# Patient Record
Sex: FEMALE | Race: WHITE | HISPANIC OR LATINO
[De-identification: names, ages, dates, MRNs, and addresses within clinical notes are randomized per-mention and may not be internally consistent; named-entity substitution may affect disease eponyms.]

---

## 2018-04-25 ENCOUNTER — LAB SERVICES (OUTPATIENT)
Dept: OTHER | Age: 28
End: 2018-04-25

## 2018-04-25 ENCOUNTER — CHARTING TRANS (OUTPATIENT)
Dept: OTHER | Age: 28
End: 2018-04-25

## 2018-04-25 LAB — RAPID STREP GROUP A: POSITIVE

## 2018-08-13 PROCEDURE — 88175 CYTOPATH C/V AUTO FLUID REDO: CPT | Performed by: NURSE PRACTITIONER

## 2018-08-22 PROBLEM — R87.612 PAPANICOLAOU SMEAR OF CERVIX WITH LOW GRADE SQUAMOUS INTRAEPITHELIAL LESION (LGSIL): Status: ACTIVE | Noted: 2018-08-22

## 2018-08-22 PROCEDURE — 88305 TISSUE EXAM BY PATHOLOGIST: CPT | Performed by: NURSE PRACTITIONER

## 2018-08-22 PROCEDURE — 88342 IMHCHEM/IMCYTCHM 1ST ANTB: CPT | Performed by: NURSE PRACTITIONER

## 2018-09-19 PROBLEM — N87.0 CIN I (CERVICAL INTRAEPITHELIAL NEOPLASIA I): Status: ACTIVE | Noted: 2018-08-01

## 2018-11-01 VITALS
TEMPERATURE: 98.4 F | BODY MASS INDEX: 23.95 KG/M2 | SYSTOLIC BLOOD PRESSURE: 110 MMHG | HEART RATE: 72 BPM | HEIGHT: 60 IN | DIASTOLIC BLOOD PRESSURE: 68 MMHG | WEIGHT: 122 LBS | RESPIRATION RATE: 18 BRPM

## 2019-05-09 PROCEDURE — 88175 CYTOPATH C/V AUTO FLUID REDO: CPT | Performed by: NURSE PRACTITIONER

## 2019-11-25 PROBLEM — N87.0 CIN I (CERVICAL INTRAEPITHELIAL NEOPLASIA I): Status: RESOLVED | Noted: 2018-08-01 | Resolved: 2019-11-25

## 2019-11-25 PROBLEM — R87.612 PAPANICOLAOU SMEAR OF CERVIX WITH LOW GRADE SQUAMOUS INTRAEPITHELIAL LESION (LGSIL): Status: RESOLVED | Noted: 2018-08-22 | Resolved: 2019-11-25

## 2023-02-08 ENCOUNTER — V-VISIT (OUTPATIENT)
Dept: FAMILY MEDICINE | Age: 33
End: 2023-02-08

## 2023-02-08 DIAGNOSIS — Z20.828 EXPOSURE TO INFLUENZA: ICD-10-CM

## 2023-02-08 DIAGNOSIS — R68.89 INFLUENZA-LIKE SYMPTOMS: Primary | ICD-10-CM

## 2023-02-08 PROCEDURE — 99213 OFFICE O/P EST LOW 20 MIN: CPT | Performed by: NURSE PRACTITIONER

## 2023-02-08 RX ORDER — OSELTAMIVIR PHOSPHATE 75 MG/1
75 CAPSULE ORAL 2 TIMES DAILY
Qty: 10 CAPSULE | Refills: 0 | Status: SHIPPED | OUTPATIENT
Start: 2023-02-08 | End: 2023-02-13

## 2023-03-07 ENCOUNTER — OFFICE VISIT (OUTPATIENT)
Dept: FAMILY MEDICINE CLINIC | Facility: CLINIC | Age: 33
End: 2023-03-07

## 2023-03-07 ENCOUNTER — LAB ENCOUNTER (OUTPATIENT)
Dept: LAB | Age: 33
End: 2023-03-07
Attending: FAMILY MEDICINE
Payer: COMMERCIAL

## 2023-03-07 ENCOUNTER — HOSPITAL ENCOUNTER (OUTPATIENT)
Dept: GENERAL RADIOLOGY | Age: 33
Discharge: HOME | End: 2023-03-07
Attending: FAMILY MEDICINE
Payer: COMMERCIAL

## 2023-03-07 VITALS
WEIGHT: 136 LBS | HEART RATE: 74 BPM | BODY MASS INDEX: 26.7 KG/M2 | SYSTOLIC BLOOD PRESSURE: 125 MMHG | HEIGHT: 60 IN | DIASTOLIC BLOOD PRESSURE: 79 MMHG

## 2023-03-07 DIAGNOSIS — Z13.1 DIABETES MELLITUS SCREENING: ICD-10-CM

## 2023-03-07 DIAGNOSIS — Z00.00 ANNUAL PHYSICAL EXAM: Primary | ICD-10-CM

## 2023-03-07 DIAGNOSIS — K59.09 OTHER CONSTIPATION: ICD-10-CM

## 2023-03-07 DIAGNOSIS — Z23 NEED FOR DIPHTHERIA-TETANUS-PERTUSSIS (TDAP) VACCINE: ICD-10-CM

## 2023-03-07 DIAGNOSIS — Z13.220 LIPID SCREENING: ICD-10-CM

## 2023-03-07 PROBLEM — M25.551 PAIN OF RIGHT HIP JOINT: Status: ACTIVE | Noted: 2022-12-15

## 2023-03-07 PROBLEM — E66.3 OVERWEIGHT WITH BODY MASS INDEX (BMI) 25.0-29.9: Status: ACTIVE | Noted: 2022-12-15

## 2023-03-07 LAB
ALBUMIN SERPL-MCNC: 4 G/DL (ref 3.4–5)
ALBUMIN/GLOB SERPL: 1.1 {RATIO} (ref 1–2)
ALP LIVER SERPL-CCNC: 50 U/L
ALT SERPL-CCNC: 32 U/L
ANION GAP SERPL CALC-SCNC: 5 MMOL/L (ref 0–18)
AST SERPL-CCNC: 27 U/L (ref 15–37)
BASOPHILS # BLD AUTO: 0.04 X10(3) UL (ref 0–0.2)
BASOPHILS NFR BLD AUTO: 0.5 %
BILIRUB SERPL-MCNC: 0.3 MG/DL (ref 0.1–2)
BUN BLD-MCNC: 18 MG/DL (ref 7–18)
BUN/CREAT SERPL: 18.2 (ref 10–20)
CALCIUM BLD-MCNC: 9.7 MG/DL (ref 8.5–10.1)
CHLORIDE SERPL-SCNC: 107 MMOL/L (ref 98–112)
CHOLEST SERPL-MCNC: 162 MG/DL (ref ?–200)
CO2 SERPL-SCNC: 28 MMOL/L (ref 21–32)
CREAT BLD-MCNC: 0.99 MG/DL
DEPRECATED RDW RBC AUTO: 45.7 FL (ref 35.1–46.3)
EOSINOPHIL # BLD AUTO: 0.01 X10(3) UL (ref 0–0.7)
EOSINOPHIL NFR BLD AUTO: 0.1 %
ERYTHROCYTE [DISTWIDTH] IN BLOOD BY AUTOMATED COUNT: 13.1 % (ref 11–15)
EST. AVERAGE GLUCOSE BLD GHB EST-MCNC: 97 MG/DL (ref 68–126)
FASTING PATIENT LIPID ANSWER: NO
FASTING STATUS PATIENT QL REPORTED: NO
GFR SERPLBLD BASED ON 1.73 SQ M-ARVRAT: 77 ML/MIN/1.73M2 (ref 60–?)
GLOBULIN PLAS-MCNC: 3.8 G/DL (ref 2.8–4.4)
GLUCOSE BLD-MCNC: 89 MG/DL (ref 70–99)
HBA1C MFR BLD: 5 % (ref ?–5.7)
HCT VFR BLD AUTO: 39.4 %
HDLC SERPL-MCNC: 76 MG/DL (ref 40–59)
HGB BLD-MCNC: 13.1 G/DL
IGA SERPL-MCNC: 219 MG/DL (ref 70–312)
IMM GRANULOCYTES # BLD AUTO: 0.01 X10(3) UL (ref 0–1)
IMM GRANULOCYTES NFR BLD: 0.1 %
LDLC SERPL CALC-MCNC: 72 MG/DL (ref ?–100)
LYMPHOCYTES # BLD AUTO: 1.86 X10(3) UL (ref 1–4)
LYMPHOCYTES NFR BLD AUTO: 21.5 %
MCH RBC QN AUTO: 31.5 PG (ref 26–34)
MCHC RBC AUTO-ENTMCNC: 33.2 G/DL (ref 31–37)
MCV RBC AUTO: 94.7 FL
MONOCYTES # BLD AUTO: 0.55 X10(3) UL (ref 0.1–1)
MONOCYTES NFR BLD AUTO: 6.4 %
NEUTROPHILS # BLD AUTO: 6.18 X10 (3) UL (ref 1.5–7.7)
NEUTROPHILS # BLD AUTO: 6.18 X10(3) UL (ref 1.5–7.7)
NEUTROPHILS NFR BLD AUTO: 71.4 %
NONHDLC SERPL-MCNC: 86 MG/DL (ref ?–130)
OSMOLALITY SERPL CALC.SUM OF ELEC: 291 MOSM/KG (ref 275–295)
PLATELET # BLD AUTO: 349 10(3)UL (ref 150–450)
POTASSIUM SERPL-SCNC: 3.8 MMOL/L (ref 3.5–5.1)
PROT SERPL-MCNC: 7.8 G/DL (ref 6.4–8.2)
RBC # BLD AUTO: 4.16 X10(6)UL
SODIUM SERPL-SCNC: 140 MMOL/L (ref 136–145)
TRIGL SERPL-MCNC: 73 MG/DL (ref 30–149)
TSI SER-ACNC: 1.3 MIU/ML (ref 0.36–3.74)
VLDLC SERPL CALC-MCNC: 11 MG/DL (ref 0–30)
WBC # BLD AUTO: 8.7 X10(3) UL (ref 4–11)

## 2023-03-07 PROCEDURE — 74019 RADEX ABDOMEN 2 VIEWS: CPT | Performed by: FAMILY MEDICINE

## 2023-03-07 PROCEDURE — 99385 PREV VISIT NEW AGE 18-39: CPT | Performed by: FAMILY MEDICINE

## 2023-03-07 PROCEDURE — 3074F SYST BP LT 130 MM HG: CPT | Performed by: FAMILY MEDICINE

## 2023-03-07 PROCEDURE — 36415 COLL VENOUS BLD VENIPUNCTURE: CPT

## 2023-03-07 PROCEDURE — 90471 IMMUNIZATION ADMIN: CPT | Performed by: FAMILY MEDICINE

## 2023-03-07 PROCEDURE — 82784 ASSAY IGA/IGD/IGG/IGM EACH: CPT

## 2023-03-07 PROCEDURE — 83036 HEMOGLOBIN GLYCOSYLATED A1C: CPT

## 2023-03-07 PROCEDURE — 84443 ASSAY THYROID STIM HORMONE: CPT

## 2023-03-07 PROCEDURE — 99203 OFFICE O/P NEW LOW 30 MIN: CPT | Performed by: FAMILY MEDICINE

## 2023-03-07 PROCEDURE — 86364 TISS TRNSGLTMNASE EA IG CLAS: CPT

## 2023-03-07 PROCEDURE — 3008F BODY MASS INDEX DOCD: CPT | Performed by: FAMILY MEDICINE

## 2023-03-07 PROCEDURE — 90715 TDAP VACCINE 7 YRS/> IM: CPT | Performed by: FAMILY MEDICINE

## 2023-03-07 PROCEDURE — 80061 LIPID PANEL: CPT

## 2023-03-07 PROCEDURE — 3078F DIAST BP <80 MM HG: CPT | Performed by: FAMILY MEDICINE

## 2023-03-07 PROCEDURE — 85025 COMPLETE CBC W/AUTO DIFF WBC: CPT

## 2023-03-07 PROCEDURE — 80053 COMPREHEN METABOLIC PANEL: CPT

## 2023-03-09 LAB — TTG IGA SER-ACNC: 0.4 U/ML (ref ?–7)

## 2023-04-07 ENCOUNTER — OFFICE VISIT (OUTPATIENT)
Dept: FAMILY MEDICINE CLINIC | Facility: CLINIC | Age: 33
End: 2023-04-07

## 2023-04-07 VITALS
HEART RATE: 90 BPM | HEIGHT: 60 IN | WEIGHT: 138 LBS | DIASTOLIC BLOOD PRESSURE: 80 MMHG | RESPIRATION RATE: 14 BRPM | SYSTOLIC BLOOD PRESSURE: 118 MMHG | OXYGEN SATURATION: 99 % | BODY MASS INDEX: 27.09 KG/M2

## 2023-04-07 DIAGNOSIS — R63.5 WEIGHT GAIN: ICD-10-CM

## 2023-04-07 DIAGNOSIS — K59.09 OTHER CONSTIPATION: Primary | ICD-10-CM

## 2023-04-07 DIAGNOSIS — F32.9 REACTIVE DEPRESSION: ICD-10-CM

## 2023-04-07 PROCEDURE — 99214 OFFICE O/P EST MOD 30 MIN: CPT | Performed by: FAMILY MEDICINE

## 2023-04-07 PROCEDURE — 3074F SYST BP LT 130 MM HG: CPT | Performed by: FAMILY MEDICINE

## 2023-04-07 PROCEDURE — 3008F BODY MASS INDEX DOCD: CPT | Performed by: FAMILY MEDICINE

## 2023-04-07 PROCEDURE — 3079F DIAST BP 80-89 MM HG: CPT | Performed by: FAMILY MEDICINE

## 2023-04-07 NOTE — PATIENT INSTRUCTIONS
Will start weaning MiraLAX. Patient to do half a capful of MiraLAX daily for the next 7 to 10 days followed by a quarter capful. Patient also try to do an elimination diet with low FODMAPs foods. To wean off of sertraline please start taking 25 mg or half of a tablet of the 50 mg for the next 1 week and then every other day for 1 week.

## 2023-04-07 NOTE — ASSESSMENT & PLAN NOTE
Patient restarted sertraline during pandemic. Is ready to wean off as she feels that her mood is much improved. Discussed with patient to do 25 mg daily for the next week and then 25 mg every other day for 1 week.

## 2023-04-07 NOTE — ASSESSMENT & PLAN NOTE
Improved with MiraLAX cleanout x3 days and now daily MiraLAX. Patient still with some bloating and gassiness. Will start weaning MiraLAX. Patient to do half a capful of MiraLAX daily for the next 7 to 10 days followed by a quarter capful. Patient also try to do an elimination diet with low FODMAPs foods. Discussed with patient that sertraline can also contribute to GI symptoms. Patient would like to wean off anyways.

## 2023-04-21 ENCOUNTER — OFFICE VISIT (OUTPATIENT)
Dept: FAMILY MEDICINE CLINIC | Facility: CLINIC | Age: 33
End: 2023-04-21

## 2023-04-21 VITALS
HEART RATE: 74 BPM | SYSTOLIC BLOOD PRESSURE: 119 MMHG | WEIGHT: 137.63 LBS | HEIGHT: 60 IN | BODY MASS INDEX: 27.02 KG/M2 | DIASTOLIC BLOOD PRESSURE: 73 MMHG

## 2023-04-21 DIAGNOSIS — E66.3 OVERWEIGHT WITH BODY MASS INDEX (BMI) 25.0-29.9: Primary | ICD-10-CM

## 2023-04-21 PROCEDURE — 3008F BODY MASS INDEX DOCD: CPT | Performed by: FAMILY MEDICINE

## 2023-04-21 PROCEDURE — 3074F SYST BP LT 130 MM HG: CPT | Performed by: FAMILY MEDICINE

## 2023-04-21 PROCEDURE — 3078F DIAST BP <80 MM HG: CPT | Performed by: FAMILY MEDICINE

## 2023-04-21 PROCEDURE — 99215 OFFICE O/P EST HI 40 MIN: CPT | Performed by: FAMILY MEDICINE

## 2023-04-21 NOTE — PATIENT INSTRUCTIONS
Nutritional Goals Reviewed and Discussed:     Calorie-controlled diet: 7454-6449 , Limit carbohydrates to 130 gms per day, Protein goal of 90-120gms per day    Follow meal plate image and 1/2 of salad plate should be veggies. Of the remaining 1/2, 3/4 should be protein and 1/4 can be carbs. Behavior Modifications Reviewed and Discussed:    Meal prep -1 meal prepping plate out the food before you put into a meal prep container. Read nutrition labels  Drink 64oz of water per day and No drinking 30 minutes before or after meals,  Maintain a daily food journal ( ie. [employer] or [employer] zachary)  Utilize portion control strategies to reduce calorie intake -eat off of a salad plate which is about 8 to 9 inches in diameter. Eat slowly and take 20 to 30 minutes to complete each meal    Importance of sleep hygiene - educational sheet provided. Physical Activity Reviewed and Discussed:  Recommended that patient decrease her strength stays to 2  days/week and increase cardio to 3 days and allow her body to rest for at least 2 days. Jessi with patient that as similar exercises are done the does become efficient and does not burn as many calories. Changing up her exercise routine will help to challenge the body. Medications Reviewed and Discussed:  Discussed with patient that she is not eligible for most weight loss medications given her BMI and no other comorbid conditions. However she could potentially try phentermine. Regarding weight loss medications. I've discussed with patients the risks and benefits of such weight loss medications. That these are treatments to be used in conjunction with diet and exercise for promoting health and weight loss.

## 2023-06-14 ENCOUNTER — OFFICE VISIT (OUTPATIENT)
Dept: FAMILY MEDICINE CLINIC | Facility: CLINIC | Age: 33
End: 2023-06-14

## 2023-06-14 VITALS
TEMPERATURE: 99 F | BODY MASS INDEX: 25.79 KG/M2 | OXYGEN SATURATION: 98 % | RESPIRATION RATE: 16 BRPM | HEART RATE: 78 BPM | HEIGHT: 60 IN | DIASTOLIC BLOOD PRESSURE: 70 MMHG | SYSTOLIC BLOOD PRESSURE: 114 MMHG | WEIGHT: 131.38 LBS

## 2023-06-14 DIAGNOSIS — E66.3 OVERWEIGHT WITH BODY MASS INDEX (BMI) 25.0-29.9: ICD-10-CM

## 2023-06-14 DIAGNOSIS — Z86.59 HISTORY OF ANOREXIA NERVOSA: ICD-10-CM

## 2023-06-14 DIAGNOSIS — K59.09 OTHER CONSTIPATION: Primary | ICD-10-CM

## 2023-06-14 PROCEDURE — 3008F BODY MASS INDEX DOCD: CPT | Performed by: FAMILY MEDICINE

## 2023-06-14 PROCEDURE — 99213 OFFICE O/P EST LOW 20 MIN: CPT | Performed by: FAMILY MEDICINE

## 2023-06-14 PROCEDURE — 3074F SYST BP LT 130 MM HG: CPT | Performed by: FAMILY MEDICINE

## 2023-06-14 PROCEDURE — 3078F DIAST BP <80 MM HG: CPT | Performed by: FAMILY MEDICINE

## 2023-06-14 NOTE — PATIENT INSTRUCTIONS
Nutritional Goals  Calorie-controlled diet:  4121-4642, Limit carbohydrates to 100-130 gms per day and Other: Protein - 90-120g    Examples of fiber: 25-35g   Artichokes  Brussel sprouts  Spinach  Berries  Plantains  Bananas  Avocados  Apples  Pomegranates      Behavior Modifications Reviewed and Discussed  Read nutrition labels, Drink 64 oz of water per day, Maintain a daily food journal, No drinking 30 minutes before or after meals, Utlize portion control strategies to reduce calorie intake and Eat slowly and take 20 to 30 minutes to complete each meal    Exercise Goals Reviewed and Discussed    Other:  Continue a variety of exercise -cardio/hiit/strength. For weight loss, recommended goals are:  300min of moderate intensity activity or 150 minutes of high intensity activity per week +2 days of strength training. Based on this you could decreased exercise to 3-4days per week.      Medication Goals Reviewed and Discussed    Not desired/needed at this time

## 2023-09-08 ENCOUNTER — OFFICE VISIT (OUTPATIENT)
Dept: FAMILY MEDICINE CLINIC | Facility: CLINIC | Age: 33
End: 2023-09-08

## 2023-09-08 VITALS
BODY MASS INDEX: 24.9 KG/M2 | DIASTOLIC BLOOD PRESSURE: 73 MMHG | HEART RATE: 71 BPM | WEIGHT: 126.81 LBS | SYSTOLIC BLOOD PRESSURE: 111 MMHG | HEIGHT: 60 IN

## 2023-09-08 DIAGNOSIS — R10.32 GROIN PAIN, LEFT: Primary | ICD-10-CM

## 2023-09-08 PROCEDURE — 3074F SYST BP LT 130 MM HG: CPT | Performed by: FAMILY MEDICINE

## 2023-09-08 PROCEDURE — 3078F DIAST BP <80 MM HG: CPT | Performed by: FAMILY MEDICINE

## 2023-09-08 PROCEDURE — 99213 OFFICE O/P EST LOW 20 MIN: CPT | Performed by: FAMILY MEDICINE

## 2023-09-08 PROCEDURE — 3008F BODY MASS INDEX DOCD: CPT | Performed by: FAMILY MEDICINE

## 2023-09-08 RX ORDER — TRAZODONE HYDROCHLORIDE 50 MG/1
50 TABLET ORAL NIGHTLY PRN
Qty: 90 TABLET | Refills: 0 | Status: SHIPPED | OUTPATIENT
Start: 2023-09-08

## 2023-09-09 ENCOUNTER — TELEPHONE (OUTPATIENT)
Dept: FAMILY MEDICINE CLINIC | Facility: CLINIC | Age: 33
End: 2023-09-09

## 2023-09-09 NOTE — TELEPHONE ENCOUNTER
Patient calling to inform that she believes she was to be prescribed naproxen anti-inflammatory per yesterdays office visit, please advise.

## 2023-09-09 NOTE — TELEPHONE ENCOUNTER
Pt advised RX is OTC, verbalized understanding     Recommended pt rest x 2 weeks and course of naproxen

## 2023-09-28 ENCOUNTER — OFFICE VISIT (OUTPATIENT)
Dept: FAMILY MEDICINE CLINIC | Facility: CLINIC | Age: 33
End: 2023-09-28

## 2023-09-28 ENCOUNTER — TELEPHONE (OUTPATIENT)
Dept: FAMILY MEDICINE CLINIC | Facility: CLINIC | Age: 33
End: 2023-09-28

## 2023-09-28 VITALS
BODY MASS INDEX: 25.32 KG/M2 | HEIGHT: 60 IN | WEIGHT: 129 LBS | HEART RATE: 86 BPM | DIASTOLIC BLOOD PRESSURE: 71 MMHG | SYSTOLIC BLOOD PRESSURE: 119 MMHG

## 2023-09-28 DIAGNOSIS — R10.32 GROIN PAIN, LEFT: Primary | ICD-10-CM

## 2023-09-28 DIAGNOSIS — S76.212D INGUINAL STRAIN, LEFT, SUBSEQUENT ENCOUNTER: Primary | ICD-10-CM

## 2023-09-28 PROCEDURE — 99213 OFFICE O/P EST LOW 20 MIN: CPT | Performed by: FAMILY MEDICINE

## 2023-09-28 PROCEDURE — 3074F SYST BP LT 130 MM HG: CPT | Performed by: FAMILY MEDICINE

## 2023-09-28 PROCEDURE — 3078F DIAST BP <80 MM HG: CPT | Performed by: FAMILY MEDICINE

## 2023-09-28 PROCEDURE — 3008F BODY MASS INDEX DOCD: CPT | Performed by: FAMILY MEDICINE

## 2023-09-28 NOTE — TELEPHONE ENCOUNTER
Patient calling to verify that ultrasound for left groin should be musculoskeletal as was told that as ultrasound was specialized, scheduling is out to October. Wants to make sure that is the correct one.

## 2023-09-29 NOTE — TELEPHONE ENCOUNTER
Patient wants clarification regarding the US that you order is it to be a \"Musculoskeletal\"  or \"regular\" to groin area. Please advise    If musculoskeletal  next availability 10/17/23    Patient would like to speak to you regarding [phone]  Order Specific Questions:  Is this exam musculoskeletal ([facility])?  Yes

## 2023-10-03 NOTE — TELEPHONE ENCOUNTER
Called and spoke with pt and discussed imaging. Will plan on moving forward with mri as will give us most information given unimproving groin pain x 3-4months despite conservative management. Pt expressed understanding.

## 2023-10-03 NOTE — TELEPHONE ENCOUNTER
Patient is calling back to follow up on the status of her questions regarding the image ordered. Please advise.

## 2023-10-04 NOTE — TELEPHONE ENCOUNTER
Dr. Deb Rizzo, please advise on [place] message. Should patient try [facility] or some other external imaging facility?

## 2023-10-05 ENCOUNTER — MED REC SCAN ONLY (OUTPATIENT)
Dept: FAMILY MEDICINE CLINIC | Facility: CLINIC | Age: 33
End: 2023-10-05

## 2023-10-05 ENCOUNTER — TELEPHONE (OUTPATIENT)
Dept: FAMILY MEDICINE CLINIC | Facility: CLINIC | Age: 33
End: 2023-10-05

## 2023-10-05 NOTE — TELEPHONE ENCOUNTER
Patient evaluation report has been signed and successfully faxed to [address] [place] [address] [phone]    Report has been submitted for scanning.

## 2023-10-06 NOTE — TELEPHONE ENCOUNTER
[facility] contacted. They are able to see order on interface in epic and are obtaining prior authorization. Nor further action.

## 2023-10-12 DIAGNOSIS — S76.212D INGUINAL STRAIN, LEFT, SUBSEQUENT ENCOUNTER: Primary | ICD-10-CM

## 2023-10-17 ENCOUNTER — OFFICE VISIT (OUTPATIENT)
Dept: PHYSICAL MEDICINE AND REHAB | Facility: CLINIC | Age: 33
End: 2023-10-17
Payer: COMMERCIAL

## 2023-10-17 VITALS
SYSTOLIC BLOOD PRESSURE: 150 MMHG | WEIGHT: 125 LBS | HEIGHT: 60 IN | DIASTOLIC BLOOD PRESSURE: 76 MMHG | BODY MASS INDEX: 24.54 KG/M2

## 2023-10-17 DIAGNOSIS — S39.81XA SPORTS HERNIA, INITIAL ENCOUNTER: Primary | ICD-10-CM

## 2023-10-17 DIAGNOSIS — S76.212A STRAIN OF ADDUCTOR MAGNUS MUSCLE, LEFT, INITIAL ENCOUNTER: ICD-10-CM

## 2023-10-17 PROCEDURE — 3008F BODY MASS INDEX DOCD: CPT | Performed by: PHYSICAL MEDICINE & REHABILITATION

## 2023-10-17 PROCEDURE — 3077F SYST BP >= 140 MM HG: CPT | Performed by: PHYSICAL MEDICINE & REHABILITATION

## 2023-10-17 PROCEDURE — 3078F DIAST BP <80 MM HG: CPT | Performed by: PHYSICAL MEDICINE & REHABILITATION

## 2023-10-17 PROCEDURE — 99204 OFFICE O/P NEW MOD 45 MIN: CPT | Performed by: PHYSICAL MEDICINE & REHABILITATION

## 2023-10-17 RX ORDER — MELOXICAM 15 MG/1
15 TABLET ORAL DAILY
Qty: 30 TABLET | Refills: 0 | Status: SHIPPED | OUTPATIENT
Start: 2023-10-17

## 2023-10-17 RX ORDER — CYCLOBENZAPRINE HCL 10 MG
10 TABLET ORAL NIGHTLY
Qty: 30 TABLET | Refills: 0 | Status: SHIPPED | OUTPATIENT
Start: 2023-10-17

## 2023-10-17 NOTE — PATIENT INSTRUCTIONS
Get started with [facility]. Start Meloxicam for the next two weeks then as needed. Additionally start muscle relaxer at night for the next two weeks then as needed. See me back in about 6 weeks at which time we may consider an injection to the area if needed.

## 2023-10-17 NOTE — PROGRESS NOTES
NEW PATIENT VISIT    CHIEF COMPLAINT  Left groin pain    HISTORY OF PRESENTING ILLNESS  Noemi Phillips is a 28year old female who presents for evaluation of left-sided groin pain. Patient presents as a referral from her primary care physician with left-sided groin pain. Patient dealing with this pain for around 7 months she denies any inciting event or trauma. Over the last 4 months her pain has been the same if not worse and is affected by her level of activity. She states she feels worse with increased pain with prolonged walking as well as abduction of her left hip and climbing stairs with the left side. She states stretching of the abductors also bothers her. Generally she is very physically active at baseline with multiple types of exercise including weightlifting, walking, boxing, spinning etc. she recently started physical therapy. She was prescribed Aleve for 2 weeks around a month ago with mild improvement however it was not long-lasting. She has not been on steroids, prescription strength anti-inflammatory or muscle relaxer. She denies any radiation of her symptoms of her lower extremities, denies any red flags. PAST MEDICAL HISTORY  Past Medical History:   Diagnosis Date    Allergic rhinitis     Anxiety     Anxiety and depression     with seasonal depression as well    ELLIS I (cervical intraepithelial neoplasia I) 08/2018    Depression     It band syndrome, right     LGSIL on Pap smear of cervix        PAST SURGICAL HISTORY  Past Surgical History:   Procedure Laterality Date    COLPOSCOPY,BX CERVIX/ENDOCERV CURR  08/22/2018    CRYOCAUTERY OF CERVIX  09/19/2018       MEDICATIONS  traZODone 50 MG Oral Tab, Take 1 tablet (50 mg total) by mouth nightly as needed for Sleep., Disp: 90 tablet, Rfl: 0    No current facility-administered medications on file prior to visit. ALLERGIES  No Known Allergies    SOCIAL HISTORY   reports that she has never smoked.  She has never used smokeless tobacco. She reports current alcohol use of about 3.0 standard drinks of alcohol per week. She reports that she does not use drugs. FAMILY HISTORY  Family History   Problem Relation Age of Onset    Hypertension Mother     Musculo-skelatal Disorder Mother         Hip and spine. She gets cortisone injections regularly on her hip. For her spine, she had exploratory surgery years ago where they went in by her ribs. Mental Disorder Father         bipolar    Depression Father         Likely bipolar;  15+ years ago (drug use)    Hypertension Maternal Grandmother     Stroke Maternal Grandmother         ; 21    Hypertension Maternal Grandfather     Breast Cancer Neg     Colon Cancer Neg     Uterine Cancer Neg     Ovarian Cancer Neg        REVIEW OF SYSTEMS  Complete review of systems was performed and was negative except for those items stated in the History of Presenting Illness and Past Medical/Surgical History. PHYSICAL EXAMINATION  GENERAL:  In no acute distress. Well-developed and well nourished. SKIN: No rashes or open wounds involving posterior torso, posterior pelvis, lower extremities. NEUROLOGIC:   Strength: Some pain limited weakness with adduction of the left hip located at the abductor attachment at the pubic symphysis. 5/5 strength in lower extremities. Sensation: intact light touch sensation throughout both lower extremities. Reflexes: intact and symmetric in bilateral lower extremities. Babinski downgoing bilaterally. No clonus. Gait: able to heel walk, toe walk, and perform tandem gait. MUSCULOSKELETAL:  Inspection: Normal alignment of lumbar spine. No shift or scoliosis. Normal posture. Equal iliac crest heights. No pelvic asymmetry. Palpation: Significant tenderness palpation over the abductor attachment at the pubic symphysis on the left side reproductive of the patient's chief complaint.   ROM: Active lumbar spine ROM is full flexion extension without exacerbation of symptoms. Passive bilateral hip ROM is full and mildly painful with external rotation and Giancarlo maneuver  Special Tests:   Sacroiliac joint provocative maneuvers are negative,  No palpable hernia  GIANCARLO produces groin pain, hip provocative's are negative including logroll and FADIR    REVIEW OF PRIOR X-RAYS/STUDIES  Independently reviewed the MRI of the pelvis dated 10/11/2023 which reveals no evidence of bone marrow edema, hip architecture patient related to lupus bilaterally. Gluteus medius and minimus tendons are unremarkable bilaterally. Hamstring tendons are normal.  Posterior imaging does reveal some bright signal at the attachment of the abductor tendons on the left side. Asymmetric compared to the right. IMPRESSION/DIAGNOSIS  Sports hernia, initial encounter  (primary encounter diagnosis)  Strain of adductor annalisa muscle, left, initial encounter    TREATMENT/PLAN  Patient does have some MRI abnormality in the area of the abductor attachment to the pubic symphysis, her clinical examination is most consistent with a strain of the abductor muscle and possible sports hernia. She would benefit from relative rest from painful activities, a course of an anti-inflammatory which I prescribed as well as consideration of as needed muscle relaxer at night and ongoing formal physical therapy. She will follow-up with me in about 6 weeks at which time if indicated we may consider trigger point injection versus a steroid injection at the area of her tendinitis. Education was provided regarding the above impression/diagnosis and treatment options/plan were discussed. All questions were answered during today's visit. Patient will contact clinic if any other questions or concerns.         Yuliana Maldonado DO  Physical Medicine and Rehabilitation / [address]

## 2023-10-24 ENCOUNTER — MED REC SCAN ONLY (OUTPATIENT)
Dept: PHYSICAL MEDICINE AND REHAB | Facility: CLINIC | Age: 33
End: 2023-10-24

## 2023-11-07 ENCOUNTER — MED REC SCAN ONLY (OUTPATIENT)
Dept: PHYSICAL MEDICINE AND REHAB | Facility: CLINIC | Age: 33
End: 2023-11-07

## 2023-11-07 ENCOUNTER — OFFICE VISIT (OUTPATIENT)
Dept: PHYSICAL MEDICINE AND REHAB | Facility: CLINIC | Age: 33
End: 2023-11-07
Payer: COMMERCIAL

## 2023-11-07 VITALS — BODY MASS INDEX: 24.54 KG/M2 | HEIGHT: 60 IN | WEIGHT: 125 LBS | RESPIRATION RATE: 18 BRPM

## 2023-11-07 DIAGNOSIS — S76.212A STRAIN OF ADDUCTOR MAGNUS MUSCLE, LEFT, INITIAL ENCOUNTER: Primary | ICD-10-CM

## 2023-11-07 DIAGNOSIS — S39.81XA SPORTS HERNIA, INITIAL ENCOUNTER: ICD-10-CM

## 2023-11-07 DIAGNOSIS — M25.551 GREATER TROCHANTERIC PAIN SYNDROME OF RIGHT LOWER EXTREMITY: ICD-10-CM

## 2023-11-07 PROCEDURE — 99214 OFFICE O/P EST MOD 30 MIN: CPT | Performed by: PHYSICAL MEDICINE & REHABILITATION

## 2023-11-07 PROCEDURE — 3008F BODY MASS INDEX DOCD: CPT | Performed by: PHYSICAL MEDICINE & REHABILITATION

## 2023-11-07 PROCEDURE — 20611 DRAIN/INJ JOINT/BURSA W/US: CPT | Performed by: PHYSICAL MEDICINE & REHABILITATION

## 2023-11-07 RX ORDER — TIZANIDINE 4 MG/1
4 TABLET ORAL EVERY 6 HOURS PRN
Qty: 30 TABLET | Refills: 1 | Status: SHIPPED | OUTPATIENT
Start: 2023-11-07 | End: 2023-12-07

## 2023-11-07 RX ORDER — TRIAMCINOLONE ACETONIDE 40 MG/ML
40 INJECTION, SUSPENSION INTRA-ARTICULAR; INTRAMUSCULAR ONCE
Status: COMPLETED | OUTPATIENT
Start: 2023-11-07 | End: 2023-11-07

## 2023-11-07 RX ORDER — BUPIVACAINE HYDROCHLORIDE 2.5 MG/ML
2 INJECTION, SOLUTION INFILTRATION; PERINEURAL ONCE
Status: COMPLETED | OUTPATIENT
Start: 2023-11-07 | End: 2023-11-07

## 2023-11-07 RX ORDER — LIDOCAINE HYDROCHLORIDE 10 MG/ML
2 INJECTION, SOLUTION INFILTRATION; PERINEURAL ONCE
Status: COMPLETED | OUTPATIENT
Start: 2023-11-07 | End: 2023-11-07

## 2023-11-07 NOTE — PROGRESS NOTES
RETURN PATIENT VISIT    CHIEF COMPLAINT  Left groin pain    INTERVAL HISTORY  Torsten Sarah is a 28year old who was last seen in clinic on 10/17/2023, at that visit patient was complaining primarily of left-sided groin pain. She was prescribed cyclobenzaprine as well as meloxicam for 2 weeks then to be used as needed. She follows up today approximately 3 weeks later stating that she has had worsening of her symptoms since her last visit. She notes pain in the bilateral hips as well as radiation of the buttocks bilaterally. She feels stiff with shooting pain in the buttocks. Notes bilateral knee stiffness. She continues with physical therapy along with home exercise plan. She continues to deny red flags or numbness and tingling in the lower extremities. Currently rates her pain 6/10. She feels the pain that was previouly in the adductor on the left is now involving the buttock and low back, she feels stiffness in the lower extremities and knees bilaterally. She feels pain in the quadriceps. These are all new symptoms. She fesl the pain is in the glureal muscles, middle and lower back. She has some stiffness and pain in the left collar bone. She states that her right lateral hip is much worse than her left laeral hip. She has pain in the left lateral hip, riht lateral hip and left groin which is ongoing. No improvement from the meloxicam or the cyclobenzaprine.      REVIEW OF SYSTEMS  Review of systems was completed with the patient today as pertinent to today's visit    PHYSICAL EXAMINATION  CONSTITUTIONAL: Well-appearing, in no apparent distress  EYES: No scleral icterus or conjunctival hemorrhage  CARDIOVASCULAR: Skin warm and well-perfused, no peripheral edema  RESPIRATORY: Breathing unlabored without accessory muscle use  PSYCHIATRIC: Alert, cooperative, appropriate mood and affect  SKIN: No lesions or rashes on exposed skin  MUSCULOSKELETAL: Palpation over the greater trochanter on the right side, hip provocative maneuvers on the right do not reproduce symptoms. Ongoing tenderness at the right greater than left pubic symphysis. NEUROLOGIC: Well-maintained strength in lower extremities there is pain limited weakness with hip flexion on the right side. REVIEW OF PRIOR X-RAYS/STUDIES  Independently reviewed the MRI of the pelvis dated 10/11/2023 which reveals no evidence of bone marrow edema, hip architecture patient related to lupus bilaterally. Gluteus medius and minimus tendons are unremarkable bilaterally. Hamstring tendons are normal.  Posterior imaging does reveal some bright signal at the attachment of the abductor tendons on the left side. Asymmetric compared to the right. IMPRESSION/DIAGNOSIS  1. Encounter Diagnoses   Name Primary? Strain of adductor annalisa muscle, left, initial encounter Yes    Sports hernia, initial encounter     Greater trochanteric pain syndrome of right lower extremity          TREATMENT/PLAN  Patient appears to have ongoing complaints of athletic pubalgia mildly but also evidence of greater trochanteric pain syndrome and right-sided buttock pain which is myofascial in etiology also consistent with greater enteric pain syndrome. We discussed intervention in the form of a greater trochanter injection which she would like to perform today, procedure note to follow. Additionally we will change the patient's muscle relaxer to tizanidine at night. She will follow-up with me in 2 weeks. Education was provided regarding the above impression/diagnosis and treatment options/plan were discussed. All questions were answered during today's visit. Patient will contact clinic if any other questions or concerns.     Becky Rose DO  Physical Medicine and Rehabilitation / [facility]

## 2023-11-07 NOTE — PATIENT INSTRUCTIONS
Steroid Injection Information  What to expect: The injection contains Lidocaine (which numbs the area) and Kenalog (a steroid which decreases inflammation). You may have pain relief within hours of the injection due to the Lidocaine. The Kenalog can take a couple days, up to a couple weeks, to reach the full effect. It is also possible to have a slight increase in symptoms over the first few days, but that should resolve fairly quickly. How long will the injection last?: The length of response to an injection is variable. Literally a couple weeks to a couple years. The injection will decrease the inflammation and the pain will return if/when the inflammation returns. Activity Recommendations: For the first 24 hours after injection, keep the area clean and dry. It is ok to shower but don't soak in a tub during that time. No vigorous activity such as running or heavy lifting for the first week but other than that you can gradually resume your normal activities immediately. If you have a significant decrease in pain, be careful not to do too much too soon. Again, the key is GRADUAL resumption of activites. Things to look out for: Common injection side effects include soreness at the injection site, bruising, flushing of the face or skin, and a temporary increase in your blood sugars and/or blood pressure. Infection is very rare but please notify my office [facility] for [address] [phone]) if you develop any fevers, drainage from the injection site, or severe increase in pain. If it is the weekend, go to an Emergency Room.      Physiatry   [phone]

## 2023-11-09 ENCOUNTER — TELEPHONE (OUTPATIENT)
Dept: FAMILY MEDICINE CLINIC | Facility: CLINIC | Age: 33
End: 2023-11-09

## 2023-11-09 NOTE — TELEPHONE ENCOUNTER
Called patient states she is currently at the ER for an evaluation of symptoms. Advised patient to keep upcoming appt with provider and to call the office with any additional concerns and updates. Patient verbalized understanding.

## 2023-11-10 ENCOUNTER — TELEPHONE (OUTPATIENT)
Dept: PHYSICAL MEDICINE AND REHAB | Facility: CLINIC | Age: 33
End: 2023-11-10

## 2023-11-10 NOTE — TELEPHONE ENCOUNTER
Initiated authorization for Right greater trochanteric bursa injection, ultrasound guidance, local anesthesia procedure.  Dx code M21.200 with [address] [place] [address] CPT Code 28292,    Decision ID #:[id number]  VALID 11/10/23-02/10/24  Status No Auth required

## 2023-11-13 ENCOUNTER — NURSE TRIAGE (OUTPATIENT)
Dept: FAMILY MEDICINE CLINIC | Facility: CLINIC | Age: 33
End: 2023-11-13

## 2023-11-13 ENCOUNTER — TELEMEDICINE (OUTPATIENT)
Dept: FAMILY MEDICINE CLINIC | Facility: CLINIC | Age: 33
End: 2023-11-13
Payer: COMMERCIAL

## 2023-11-13 DIAGNOSIS — R06.02 SHORTNESS OF BREATH: ICD-10-CM

## 2023-11-13 DIAGNOSIS — R09.81 NASAL CONGESTION: ICD-10-CM

## 2023-11-13 DIAGNOSIS — R05.1 ACUTE COUGH: ICD-10-CM

## 2023-11-13 DIAGNOSIS — U07.1 COVID-19: Primary | ICD-10-CM

## 2023-11-13 LAB — AMB EXT COVID-19 RESULT: DETECTED

## 2023-11-13 RX ORDER — BENZONATATE 200 MG/1
200 CAPSULE ORAL 3 TIMES DAILY PRN
Qty: 90 CAPSULE | Refills: 0 | Status: SHIPPED | OUTPATIENT
Start: 2023-11-13

## 2023-11-13 RX ORDER — ALBUTEROL SULFATE 90 UG/1
2 AEROSOL, METERED RESPIRATORY (INHALATION) EVERY 4 HOURS PRN
Qty: 18 G | Refills: 0 | Status: SHIPPED | OUTPATIENT
Start: 2023-11-13

## 2023-11-13 RX ORDER — FLUTICASONE PROPIONATE 50 MCG
2 SPRAY, SUSPENSION (ML) NASAL DAILY
Qty: 16 G | Refills: 0 | Status: SHIPPED | OUTPATIENT
Start: 2023-11-13 | End: 2024-11-07

## 2023-11-13 NOTE — TELEPHONE ENCOUNTER
Action Requested: Summary for Provider     []  Critical Lab, Recommendations Needed  [] Need Additional Advice  []   FYI    []   Need Orders  [] Need Medications Sent to Pharmacy  []  Other     SUMMARY: Video appointment scheduled today, Home care and CDC isolation guidelines were  provided, infection banner updated, advised urgent care or immediate care  for worsening symptoms and emergency room  for shortness of breath or chest pain. Future Appointments   Date Time Provider Annelise Willett   11/13/2023 10:30 AM BETHANIE Reis       Reason for call: Infection (COVID +)  Onset: Data Unavailable    Tested positive home  COVID today 11/13/23. Symptoms started yesterday ==sore throat, scratchy throat, white spot back throat, nasal congestion, little bit short of breath, not in distress, no fever, T=98.2 . Patient was informed that  a strep test might be ordered and she  would need to go to one of our labs to do it. Patient advised that there may be a co-pay involved with this type of visit. Patient agreed to proceed, they understand the provider may be calling from a blocked, or unknown phone number on their caller ID and they know to answer the phone. Best call back:  [phone]      [facility] due to high heart rate and shortness of breath x 1 week, constant chest pressure,  currently on a 2-week Holter monitor, she is scheduled to see the cardiology specialist on 12/ 6 /23 Dr. Opal Daily [facility] cardiology ). Her regular resting HR=50's, currently HR=72-80. Her upcoming ED FU appointment scheduled on 11/15/23 was changed from in office to a video due to positive COVID . Future Appointments   Date Time Provider Annelise Hill   11/15/2023  2:20 PM Eli Lockhart DO [facility]         Patient scheduled for a video visit. Patient advised to complete the E-check in [employer], if active. Understands to follow the prompts and links to complete the visit.         Reason for Disposition   [1] COVID-19 infection suspected by caller or triager AND [2] mild symptoms (cough, fever, or others) AND [3] negative COVID-19 rapid test    Protocols used: Coronavirus (COVID-19) Diagnosed or [id number]

## 2023-11-13 NOTE — PROGRESS NOTES
Virtual Virtual Check-In    Carmela Tolentino verbally consents to a Virtual Video Check-In service on 11/13/23. Patient understands and accepts financial responsibility for any deductible, co-insurance and/or co-pays associated with this service. This visit is conducted using Telemedicine with live, interactive video and audio. I spoke with Carmela Tolentino by secure video chat, verified date of birth, and discussed their current concerns:   Duration: 15 minutes     HPI  Carmela Tolentino presented via video visit for complaints of sore throat, fatigue, body aches, nasal congestion, shortness of breath. Denies fever, dizziness, changes in vision. Recently in the ER for shortness of breath and chest discomfort. Wearing halter monitor x 2 weeks. Following with cardiology. Tested positive for COVID 11/13/23.  with similar symptoms. Review of Systems:   Review of Systems   Constitutional:  Positive for chills and fatigue. Negative for activity change and fever. HENT:  Positive for congestion, postnasal drip, rhinorrhea and sore throat. Negative for sneezing. Respiratory:  Positive for cough and shortness of breath. Negative for chest tightness, wheezing and stridor. Cardiovascular:  Negative for chest pain and palpitations. Gastrointestinal: Negative. Neurological: Negative. Negative for dizziness, weakness and headaches. Psychiatric/Behavioral: Negative. All other systems reviewed and are negative. Reviewed Active Problems:  Patient Active Problem List    Diagnosis    History of anorexia nervosa    Other constipation    Reactive depression    Overweight with body mass index (BMI) 25.0-29.9     Initial Weight C/s: 4/21/23  Initial Weight: 137  Goal Weight: 122-125  Lifestyle modifications (diet + exercise): 6 months, previously 2-2. 5years, but did not sustain weight loss  Barriers: calories and carbs above ideal for weight loss.   Pt also does strength training 4-5x per week and adds in cardio. Definitely meeting exercise goals for weight loss, but may need to change up to increase cardiac activity. Hx of anorexia and overuse injury of R hip. Comorbid conditions: reactive depression  Medications:  Not indicated/desired at this time. Pain of right hip joint      Reviewed Past Medical History:  Past Medical History:   Diagnosis Date    Allergic rhinitis     Anxiety     Anxiety and depression     with seasonal depression as well    ELLIS I (cervical intraepithelial neoplasia I) 2018    Depression     It band syndrome, right     LGSIL on Pap smear of cervix       Reviewed Family History:  Family History   Problem Relation Age of Onset    Hypertension Mother     Musculo-skelatal Disorder Mother         Hip and spine. She gets cortisone injections regularly on her hip. For her spine, she had exploratory surgery years ago where they went in by her ribs. Mental Disorder Father         bipolar    Depression Father         Likely bipolar;  15+ years ago (drug use)    Hypertension Maternal Grandmother     Stroke Maternal Grandmother         ; 21    Hypertension Maternal Grandfather     Breast Cancer Neg     Colon Cancer Neg     Uterine Cancer Neg     Ovarian Cancer Neg        Reviewed Social History:  Social History     Socioeconomic History    Marital status:    Tobacco Use    Smoking status: Never    Smokeless tobacco: Never   Vaping Use    Vaping Use: Never used   Substance and Sexual Activity    Alcohol use: Yes     Alcohol/week: 3.0 standard drinks of alcohol     Types: 3 Glasses of wine per week    Drug use: Never    Sexual activity: Yes     Partners: Male     Birth control/protection: Pill      Reviewed Current Medications:  Current Outpatient Medications   Medication Sig Dispense Refill    tiZANidine 4 MG Oral Tab Take 1 tablet (4 mg total) by mouth every 6 (six) hours as needed.  30 tablet 1    Meloxicam 15 MG Oral Tab Take 1 tablet (15 mg total) by mouth daily. 30 tablet 0    cyclobenzaprine 10 MG Oral Tab Take 1 tablet (10 mg total) by mouth nightly. 30 tablet 0    traZODone 50 MG Oral Tab Take 1 tablet (50 mg total) by mouth nightly as needed for Sleep. 90 tablet 0          Physical Exam  There were no vitals filed for this visit. Physical Exam  Vitals reviewed. Constitutional:       General: She is not in acute distress. Appearance: Normal appearance. HENT:      Head: Normocephalic and atraumatic. Pulmonary:      Effort: Pulmonary effort is normal.   Neurological:      General: No focal deficit present. Mental Status: She is alert and oriented to person, place, and time. Psychiatric:         Mood and Affect: Mood normal.         Behavior: Behavior normal.          Diagnosis:  (U07.1) COVID-19  (primary encounter diagnosis)  (R05.1) Acute cough  (R09.81) Nasal congestion  (R06.02) Shortness of breath  Plan: benzonatate 200 MG Oral Cap, albuterol 108 (90         Base) MCG/ACT Inhalation Aero Soln, fluticasone        propionate 50 MCG/ACT Nasal Suspension  Patient tested positive for COVID-19 on 11/13/2023. Discussed quarantine precautions. Note for work given. Advised supportive care. Supportive care - humidifier at night, hot steam showers, lozenges, hot/cold beverages, LOTS of fluids, rest.  Discussed Paxlovid, declined at this time. Prescription for Tessalon Perles 3 times daily as needed for cough to pharmacy. Prescription for albuterol every 4 hours as needed for shortness of breath and pharmacy. Prescription for Flonase to pharmacy for congestion. Advised to continue to follow with cardiology for Holter monitor. Advised strict instructions to return to the ER for prolonged shortness of breath, chest pain. Please note that the following visit was completed using two-way, real-time interactive audio and/or video communication.   This has been done in good isha to provide continuity of care in the best interest of the provider-patient relationship, due to the ongoing public health crisis/national emergency and because of restrictions of visitation. There are limitations of this visit as no physical exam could be performed. Every conscious effort was taken to allow for sufficient and adequate time. This billing was spent on reviewing labs, medications, radiology tests and decision making. Appropriate medical decision-making and tests are ordered as detailed in the plan of care above. Barber Mcmahon understands video evaluation is not a substitute for in person examination or emergency care. Patient advised to go to ER or call 911 for worsening symptoms or acute distress. This note was prepared using [employer] voice recognition dictation software. As a result errors may occur. When identified these errors have been corrected.  While every attempt is made to correct errors during dictation discrepancies may still exist.  BETHANIE Resendez

## 2023-11-15 ENCOUNTER — TELEMEDICINE (OUTPATIENT)
Dept: FAMILY MEDICINE CLINIC | Facility: CLINIC | Age: 33
End: 2023-11-15
Payer: COMMERCIAL

## 2023-11-15 DIAGNOSIS — R00.0 TACHYCARDIA: Primary | ICD-10-CM

## 2023-11-15 PROCEDURE — 99214 OFFICE O/P EST MOD 30 MIN: CPT | Performed by: FAMILY MEDICINE

## 2023-11-15 NOTE — PROCEDURES
PROCEDURE NOTE    DIAGNOSIS  Right greater trochanteric bursitis/pain syndrome    PROCEDURE  Ultrasound guided Right greater trochanteric bursa injection    Performing physician  Iliana Pina DO    PROCEDURE NOTE  Informed consent was obtained. Risks and benefits of the procedure were explained. The patient was placed in a sidelying position and the Right greater trochanter was identified under ultrasound using the curvilinear transducer. The area of maximal tenderness was identified. The area was prepped with Betadine x 3 and alcohol swab. Sterile ultrasound probe cover was used. Sterile ultrasound gel was applied. A 27-gauge 1.5 inch needle was inserted into this area and 1-2 mL of 1% lidocaine was infused subcutaneously to anesthetize the region. Then, a 22-gauge 3.5 inch needle was advanced under ultrasound guidance to the target, using an in-plane approach. Then, 1 mL of 40 mg/mL Triamcinolone, 2 mL of 1% Lidocaine, and 2 mL of 0.25% Bupivacaine was infused. The patient tolerated the procedure without complications. Post procedure instructions were provided.         [place] Kevin SARAVIA  Physical Medicine and Rehabilitation / [address] [facility]

## 2023-11-15 NOTE — PROGRESS NOTES
CC:  No chief complaint on file. HPI: Ana Lilly is a 28year old female presenting for video visit   Was seen in ED for elevated hr x 1-1.5wks. First noticed it while on vacation around 11/1, 11/2. Escalated on the 11/6. Resting hr has been slowly increasing to 82 (om 11/6) - typically 46s. Has been having difficulty with normal activities. Ie) when doing hair. 125-130 on fit bit. Can feel it. Feels out of breath with minimal activity. Went to OSH on 11/9 - EKG nml, Labs were normal per pt - TSH borderline, and CT was neg for PE. Was given event monitor for 2 weeks, which will be completed on 11/23. Has been off of ocps for the last 6-8months     Started on meloxicam and a muscle relaxant that started on 10/17 -11/6. But symptoms have persisted dspite spotting. Tested positive for covid on 11/13.  11/12 started with scratchy throat.  began not feeling well 11/9. Periods are regular, light. 2-3d    ROS:  General:  no fever. +fatigue with covid. HEENT:  Denies congestion or nasal discharge  Cardio:  No chest pain.  + heart racing and chest heaviness. Pulmonary:  + sob with exertion   GI:  No N/V/D  :  No discharge, no dysuria, no polyuria, no hematuria  Dermatologic:  No rashes    Past Medical History:   Diagnosis Date    Allergic rhinitis     Anxiety     Anxiety and depression     with seasonal depression as well    ELLIS I (cervical intraepithelial neoplasia I) 08/2018    Depression     It band syndrome, right     LGSIL on Pap smear of cervix        Social History     Socioeconomic History    Marital status:      Spouse name: Not on file    Number of children: Not on file    Years of education: Not on file    Highest education level: Not on file   Occupational History    Not on file   Tobacco Use    Smoking status: Never    Smokeless tobacco: Never   Vaping Use    Vaping Use: Never used   Substance and Sexual Activity    Alcohol use:  Yes     Alcohol/week: 3.0 standard drinks of alcohol     Types: 3 Glasses of wine per week    Drug use: Never    Sexual activity: Yes     Partners: Male     Birth control/protection: Pill   Other Topics Concern    Not on file   Social History Narrative    Not on file     Social Determinants of Health     Financial Resource Strain: Not on file   Food Insecurity: Not on file   Transportation Needs: Not on file   Physical Activity: Not on file   Stress: Not on file   Social Connections: Not on file   Housing Stability: Not on file       Current Outpatient Medications   Medication Sig Dispense Refill    benzonatate 200 MG Oral Cap Take 1 capsule (200 mg total) by mouth 3 (three) times daily as needed. 90 capsule 0    albuterol 108 (90 Base) MCG/ACT Inhalation Aero Soln Inhale 2 puffs into the lungs every 4 (four) hours as needed for Shortness of Breath. 18 g 0    fluticasone propionate 50 MCG/ACT Nasal Suspension 2 sprays by Each Nare route daily. 16 g 0    tiZANidine 4 MG Oral Tab Take 1 tablet (4 mg total) by mouth every 6 (six) hours as needed. 30 tablet 1    traZODone 50 MG Oral Tab Take 1 tablet (50 mg total) by mouth nightly as needed for Sleep. 90 tablet 0       Patient has no known allergies. Vitals: There were no vitals filed for this visit. Physical:  General:  Alert, appropriate, no acute distress, A&O x 3. Pulmonary:  Speaking in clear and full sentences, no dyspnea   Psych: Tearful when talking about health issues, heart racing and not knowing why. Assessment and Plan: Sincere Jimenez is a 31yo F with no pmhx of cardiac/pulmonary disease seen virtually for ED f/u after being evaluated for tachycardia. Unclear etiology. Pt with neg ACS r/o, neg CT PE. Unlikely due to anemia as per pt cbc was normal, and menses light. Pts had TSH checked in 3/2023 was wnl. D/w pt that holter monitor should give us a better idea of what is going on.   Symptoms may also be exacerbated by covid 19 infection and subsequent anxiety further making symptoms worse. Will also rpt tsh and check echo. Low suspicion for pheochromocytoma given no ha, diaphoresis, htn. Recommended pt not wearing fitbit so as to decrease focus on heart rate and to try to destress and relax as much as possible. Pt has f/u with cards in 3 weeks. Reviewed with pt ED precautions. There are no diagnoses linked to this encounter. None  Orders Placed This Encounter   Procedures    TSH W Reflex To Free T4     Standing Status:   Future     Standing Expiration Date:   11/15/2024     Order Specific Question:   Release to patient     Answer:   Immediate    Thyroid Peroxidase (TPO) AB [E]     Standing Status:   Future     Standing Expiration Date:   11/15/2024     Order Specific Question:   Release to patient     Answer:   Immediate    Thyroid Antithyroglobulin AB [E]     Standing Status:   Future     Standing Expiration Date:   11/15/2024     Order Specific Question:   Release to patient     Answer:   Immediate       Please note that the following visit was completed using two-way, real-time interactive audio and video communication. This has been done in good isha to provide continuity of care in the best interest of the provider-patient relationship, due to the ongoing public health crisis/national emergency and because of restrictions of visitation. There are limitations of this visit as no physical exam could be performed. Every conscious effort was taken to allow for sufficient and adequate time. This billing was spent on reviewing labs, medications, radiology tests and decision making. Appropriate medical decision-making and tests are ordered as detailed in the plan of care above.       [address] [facility], DO  11/15/23  3:00 PM

## 2023-11-18 ENCOUNTER — LAB ENCOUNTER (OUTPATIENT)
Dept: LAB | Age: 33
End: 2023-11-18
Attending: FAMILY MEDICINE
Payer: COMMERCIAL

## 2023-11-18 DIAGNOSIS — R00.0 TACHYCARDIA: ICD-10-CM

## 2023-11-18 LAB
THYROPEROXIDASE AB SERPL-ACNC: <28 U/ML (ref ?–60)
TSI SER-ACNC: 1.62 MIU/ML (ref 0.55–4.78)

## 2023-11-18 PROCEDURE — 86376 MICROSOMAL ANTIBODY EACH: CPT

## 2023-11-18 PROCEDURE — 84443 ASSAY THYROID STIM HORMONE: CPT

## 2023-11-18 PROCEDURE — 86800 THYROGLOBULIN ANTIBODY: CPT

## 2023-11-18 PROCEDURE — 36415 COLL VENOUS BLD VENIPUNCTURE: CPT

## 2023-11-19 LAB — THYROGLOB SERPL-MCNC: <15 U/ML (ref ?–60)

## 2023-11-21 ENCOUNTER — TELEMEDICINE (OUTPATIENT)
Dept: PHYSICAL MEDICINE AND REHAB | Facility: CLINIC | Age: 33
End: 2023-11-21
Payer: COMMERCIAL

## 2023-11-21 DIAGNOSIS — S39.81XA SPORTS HERNIA, INITIAL ENCOUNTER: ICD-10-CM

## 2023-11-21 DIAGNOSIS — S76.212A STRAIN OF ADDUCTOR MAGNUS MUSCLE, LEFT, INITIAL ENCOUNTER: Primary | ICD-10-CM

## 2023-11-21 DIAGNOSIS — M25.551 GREATER TROCHANTERIC PAIN SYNDROME OF RIGHT LOWER EXTREMITY: ICD-10-CM

## 2023-11-21 NOTE — PROGRESS NOTES
[address]    Telemedicine Visit - Follow Up Evaluation    Telehealth Verbal Consent   I conducted a telehealth visit with Tracee Galan today, 11/21/23, which was completed using two-way, real-time interactive audio and video communication. The patient was made aware of the limitations of the telehealth visit, including treatment limitations as no physical exam could be performed. The patient was advised to call 911 or to go to the ER in case there was an emergency. The patient was also advised of the potential privacy & security concerns related to the telehealth platform. The patient was made aware of where to find [facility] notice of privacy practices, telehealth consent form and other related consent forms and documents. which are located on the [facility] website. The patient verbally agreed to telehealth consent form, related consents and the risks discussed. Lastly, the patient confirmed that they were in [place]. Included in this visit, time may have been spent reviewing labs, medications, radiology tests and decision making. Appropriate medical decision-making and tests are ordered as detailed in the plan of care above. Coding/billing information is submitted for this visit based on complexity of care and/or time spent for the visit. Chief Complaint: Lateral hip pain, left groin pain    HISTORY OF PRESENT ILLNESS:   The patient is a 28year old female with right lateral hip pain and left groin pain. Patient was converted to video visit as she is currently dealing with a recent COVID diagnosis. She is following up after GT bursa injection. She states that the injection helped the lateral hip pain. She unfortunately noted ongoing increased heart rate which caused her some anxiety, she presented to the emergency department and was checked out for any acute cardiovascular issue, work-up has been negative thus far fortunately.   She is scheduled see cardiologist in the near future. She has been wearing a Holter monitor for monitoring. She took the tizanidine once but didn't continue due to concerns about her heart rate. She is not as debilitated from her lateral thigh pain. Additionally, she is feeling improvement in her adductor pain, she feels this pain is working itself out with PT     She is now focusing more on the lateral thigh in her most recent session. PAST MEDICAL HISTORY:     Past Medical History:   Diagnosis Date    Allergic rhinitis     Anxiety     Anxiety and depression     with seasonal depression as well    ELLIS I (cervical intraepithelial neoplasia I) 08/2018    Depression     It band syndrome, right     LGSIL on Pap smear of cervix          PAST SURGICAL HISTORY:     Past Surgical History:   Procedure Laterality Date    COLPOSCOPY,BX CERVIX/ENDOCERV CURR  08/22/2018    CRYOCAUTERY OF CERVIX  09/19/2018         CURRENT MEDICATIONS:     Current Outpatient Medications   Medication Sig Dispense Refill    benzonatate 200 MG Oral Cap Take 1 capsule (200 mg total) by mouth 3 (three) times daily as needed. 90 capsule 0    albuterol 108 (90 Base) MCG/ACT Inhalation Aero Soln Inhale 2 puffs into the lungs every 4 (four) hours as needed for Shortness of Breath. 18 g 0    fluticasone propionate 50 MCG/ACT Nasal Suspension 2 sprays by Each Nare route daily. 16 g 0    tiZANidine 4 MG Oral Tab Take 1 tablet (4 mg total) by mouth every 6 (six) hours as needed. 30 tablet 1    traZODone 50 MG Oral Tab Take 1 tablet (50 mg total) by mouth nightly as needed for Sleep. 90 tablet 0         ALLERGIES:   No Known Allergies      FAMILY HISTORY:     Family History   Problem Relation Age of Onset    Hypertension Mother     Musculo-skelatal Disorder Mother         Hip and spine. She gets cortisone injections regularly on her hip. For her spine, she had exploratory surgery years ago where they went in by her ribs.     Mental Disorder Father bipolar    Depression Father         Likely bipolar;  15+ years ago (drug use)    Hypertension Maternal Grandmother     Stroke Maternal Grandmother         ; 21    Hypertension Maternal Grandfather     Breast Cancer Neg     Colon Cancer Neg     Uterine Cancer Neg     Ovarian Cancer Neg           SOCIAL HISTORY:     Social History     Socioeconomic History    Marital status:    Tobacco Use    Smoking status: Never    Smokeless tobacco: Never   Vaping Use    Vaping Use: Never used   Substance and Sexual Activity    Alcohol use: Yes     Alcohol/week: 3.0 standard drinks of alcohol     Types: 3 Glasses of wine per week    Drug use: Never    Sexual activity: Yes     Partners: Male     Birth control/protection: Pill          REVIEW OF SYSTEMS:   A comprehensive 10 point review of systems was completed. Pertinent positives and negatives noted in the the HPI. PHYSICAL EXAM:   General: No immediate distress  Head: Normocephalic/ Atraumatic  Eyes: Extra-occular movements intact  Ears/Nose/Throat:  External appearance identifies normal appearance without obvious deformity  Cardiovascular: No cyanosis, clubbing or edema  Respiratory: Non-labored respirations  Skin: No lesions noted   Neurological: alert & oriented x 3, attentive, able to follow commands, comprehention intact, spontaneous speech intact  Psychiatric: Mood and affect appropriate    Musculoskeletal Exam:  Physical exam limited by video visit however patient is sitting comfortably. Conversational.  Patient endorses good subjective strength in the upper and lower extremities.     LABS:     Lab Results   Component Value Date    EAG 97 2023    A1C 5.0 2023     Lab Results   Component Value Date    WBC 8.7 2023    RBC 4.16 2023    HGB 13.1 2023    HCT 39.4 2023    MCV 94.7 2023    MCH 31.5 2023    MCHC 33.2 2023    RDW 13.1 2023    .0 2023     Lab Results Component Value Date    GLU 89 03/07/2023    BUN 18 03/07/2023    BUNCREA 18.2 03/07/2023    CREATSERUM 0.99 03/07/2023    ANIONGAP 5 03/07/2023    CA 9.7 03/07/2023    OSMOCALC 291 03/07/2023    ALKPHO 50 03/07/2023    AST 27 03/07/2023    ALT 32 03/07/2023    BILT 0.3 03/07/2023    TP 7.8 03/07/2023    ALB 4.0 03/07/2023    GLOBULIN 3.8 03/07/2023     03/07/2023    K 3.8 03/07/2023     03/07/2023    CO2 28.0 03/07/2023     No results found for: \"PTP\", \"PT\", \"INR\"  No results found for: \"VITD\", \"QVITD\", \"GDJA38RY\"    IMAGING:   No new imaging to review    ASSESSMENT:     1. Strain of adductor annalisa muscle, left, initial encounter    2. Sports hernia, initial encounter    3. Greater trochanteric pain syndrome of right lower extremity          PLAN:   Patient will continue with physical therapy, she is improving slowly, rest from painful activities as well as completing cardiac work-up. She will return after physical therapy at which time further intervention may be considered either in the form of repeat GT bursa injection or consideration of PRP to the abductors if athletic pubalgia remains functionally limiting for her. If cardiac work-up is negative, she may resume tizanidine as needed. The patient verbalized understanding with this plan and was in agreement. There are no barriers to learning. All questions were answered.     Duration of the service: 15 minutes    Corinne Bridges DO  Physical Medicine and Rehabilitation / [address] [facility]

## 2023-12-04 ENCOUNTER — MED REC SCAN ONLY (OUTPATIENT)
Dept: PHYSICAL MEDICINE AND REHAB | Facility: CLINIC | Age: 33
End: 2023-12-04

## 2023-12-06 ENCOUNTER — HOSPITAL ENCOUNTER (OUTPATIENT)
Dept: CV DIAGNOSTICS | Facility: HOSPITAL | Age: 33
Discharge: HOME | End: 2023-12-06
Attending: FAMILY MEDICINE
Payer: COMMERCIAL

## 2023-12-06 DIAGNOSIS — R00.0 TACHYCARDIA: ICD-10-CM

## 2023-12-06 PROCEDURE — 93306 TTE W/DOPPLER COMPLETE: CPT | Performed by: FAMILY MEDICINE

## 2023-12-06 RX ORDER — TRAZODONE HYDROCHLORIDE 50 MG/1
50 TABLET ORAL NIGHTLY PRN
Qty: 90 TABLET | Refills: 0 | Status: SHIPPED | OUTPATIENT
Start: 2023-12-06

## 2023-12-06 NOTE — TELEPHONE ENCOUNTER
Please review; protocol failed.       Medication pended for your review / approval    Requested Prescriptions   Pending Prescriptions Disp Refills    TRAZODONE 50 MG Oral Tab [Pharmacy Med Name: TRAZODONE 50MG TABLETS] 90 tablet 0     Sig: TAKE 1 TABLET(50 MG) BY MOUTH EVERY NIGHT AS NEEDED FOR SLEEP       There is no refill protocol information for this order          Recent Outpatient Visits              2 weeks ago Strain of adductor annalisa muscle, left, initial encounter    [facility] K[place],     Telemedicine    3 weeks ago Tachycardia    [address] [facility] Leo Shin DO    Telemedicine    3 weeks ago [address] [facility] BETHANIE Hughes    Telemedicine    4 weeks ago Strain of adductor annalisa muscle, left, initial encounter    [facility] DO    Office Visit    1 month ago Sports hernia, initial encounter    [facility] [place], [place]    Office Visit

## 2023-12-20 LAB
CYTOLOGY CVX/VAG DOC THIN PREP: NORMAL
HPV16+18+45 E6+E7MRNA CVX NAA+PROBE: NEGATIVE

## 2024-01-02 ENCOUNTER — MED REC SCAN ONLY (OUTPATIENT)
Dept: PHYSICAL MEDICINE AND REHAB | Facility: CLINIC | Age: 34
End: 2024-01-02

## 2024-02-05 ENCOUNTER — MED REC SCAN ONLY (OUTPATIENT)
Dept: PHYSICAL MEDICINE AND REHAB | Facility: CLINIC | Age: 34
End: 2024-02-05

## 2024-02-21 PROCEDURE — 81265 STR MARKERS SPECIMEN ANAL: CPT | Performed by: CLINICAL MEDICAL LABORATORY

## 2024-02-21 PROCEDURE — 88233 TISSUE CULTURE SKIN/BIOPSY: CPT | Performed by: CLINICAL MEDICAL LABORATORY

## 2024-02-21 PROCEDURE — 88262 CHROMOSOME ANALYSIS 15-20: CPT | Performed by: CLINICAL MEDICAL LABORATORY

## 2024-02-21 PROCEDURE — 81229 CYTOG ALYS CHRML ABNR SNPCGH: CPT | Performed by: CLINICAL MEDICAL LABORATORY

## 2024-02-21 PROCEDURE — PSEU9945 MATERNAL CELL CONTAMINATION: Performed by: CLINICAL MEDICAL LABORATORY

## 2024-02-21 PROCEDURE — PSEU10072 CHROMOSOME ANALYSIS WITH MICROARRAY REFLEX - PRODUCTS OF CONCEPTION: Performed by: CLINICAL MEDICAL LABORATORY

## 2024-02-21 PROCEDURE — PSEU10097 GENOMIC MICROARRAY PRODUCTS OF CONCEPTION: Performed by: CLINICAL MEDICAL LABORATORY

## 2024-02-22 ENCOUNTER — LAB REQUISITION (OUTPATIENT)
Dept: LAB | Age: 34
End: 2024-02-22

## 2024-02-22 DIAGNOSIS — O02.1 MISSED ABORTION (CMD): ICD-10-CM

## 2024-02-23 ENCOUNTER — CLINICAL ABSTRACT (OUTPATIENT)
Dept: CARE COORDINATION | Age: 34
End: 2024-02-23

## 2024-03-12 ENCOUNTER — MED REC SCAN ONLY (OUTPATIENT)
Dept: PHYSICAL MEDICINE AND REHAB | Facility: CLINIC | Age: 34
End: 2024-03-12

## 2024-04-11 LAB
ADDITIONAL COMMENTS: ABNORMAL
ADDITIONAL COMMENTS: NORMAL
CELL GROWTH FIB QL TISS CULTURE: ABNORMAL
CELL GROWTH FIB QL TISS CULTURE: NORMAL
GENE ANALYSIS NARR RPT DOC: ABNORMAL
INTERPRETATION: ABNORMAL
KARYOTYP CVS: ABNORMAL
KARYOTYP CVS: NORMAL
KARYOTYP CVS: NORMAL
Lab: ABNORMAL
Lab: ABNORMAL
Lab: NORMAL
Lab: NORMAL
MATERNAL CELL CONTAM SPEC: NOT DETECTED
RESULT CATEGORY: ABNORMAL
SAMPLE IN QUESTION: NORMAL
SERVICE CMNT-IMP: NORMAL
SPECIMEN SOURCE: NORMAL

## 2024-05-01 ENCOUNTER — MED REC SCAN ONLY (OUTPATIENT)
Dept: PHYSICAL MEDICINE AND REHAB | Facility: CLINIC | Age: 34
End: 2024-05-01

## 2025-05-29 ENCOUNTER — HOSPITAL ENCOUNTER (OUTPATIENT)
Dept: GENERAL RADIOLOGY | Age: 35
Discharge: HOME | End: 2025-05-29
Attending: STUDENT IN AN ORGANIZED HEALTH CARE EDUCATION/TRAINING PROGRAM
Payer: COMMERCIAL

## 2025-05-29 ENCOUNTER — OFFICE VISIT (OUTPATIENT)
Dept: FAMILY MEDICINE CLINIC | Facility: CLINIC | Age: 35
End: 2025-05-29
Payer: COMMERCIAL

## 2025-05-29 VITALS
WEIGHT: 118.19 LBS | SYSTOLIC BLOOD PRESSURE: 113 MMHG | DIASTOLIC BLOOD PRESSURE: 82 MMHG | HEIGHT: 60 IN | HEART RATE: 79 BPM | TEMPERATURE: 99 F | OXYGEN SATURATION: 99 % | RESPIRATION RATE: 20 BRPM | BODY MASS INDEX: 23.2 KG/M2

## 2025-05-29 DIAGNOSIS — M25.50 POLYARTHRALGIA: Primary | ICD-10-CM

## 2025-05-29 DIAGNOSIS — M89.8X1 CLAVICLE PAIN: ICD-10-CM

## 2025-05-29 DIAGNOSIS — F41.9 ANXIETY: ICD-10-CM

## 2025-05-29 PROCEDURE — 73000 X-RAY EXAM OF COLLAR BONE: CPT | Performed by: STUDENT IN AN ORGANIZED HEALTH CARE EDUCATION/TRAINING PROGRAM

## 2025-05-29 PROCEDURE — 99214 OFFICE O/P EST MOD 30 MIN: CPT | Performed by: STUDENT IN AN ORGANIZED HEALTH CARE EDUCATION/TRAINING PROGRAM

## 2025-05-29 RX ORDER — GABAPENTIN 300 MG/1
300 CAPSULE ORAL NIGHTLY
COMMUNITY
Start: 2025-04-11

## 2025-05-29 RX ORDER — ESTAZOLAM 2 MG/1
1 TABLET ORAL DAILY
COMMUNITY

## 2025-05-29 RX ORDER — GLYCERIN/MINERAL OIL
LOTION (ML) TOPICAL AS DIRECTED
COMMUNITY

## 2025-05-29 RX ORDER — MELOXICAM 7.5 MG/1
7.5 TABLET ORAL DAILY
COMMUNITY
Start: 2025-05-14

## 2025-05-29 NOTE — PROGRESS NOTES
HPI:      Patient ID: Kaya Lantigua is a 34 year old female.    HPI  Pt presenting with chronic pain.    Left labral hip repair July 2024  Subsequent Right pain  Dx labral tear, piriformis syndrome s/p injection  Labral reconstruction Jan 2025 with donor tissue  Continuing PT    Acute onset neck pain Dec 2024  Upper back/shoulder pain Jan 2025 after back strain  Ortho addressing with MRI  Dx scapular dyskinesis  PT ordered twice weekly for hip, scapula  Chiro twice weekly for neck    Left collarbone grinding/crepitus    Whoosing in Left ear onset since Dec 2024 since neck flared    Gabapentin, Cymbalta without benefit  Ketamine infusion  Started therapy     Regular period    Prenatal MVI  On POP  Vit D, vit C  Electrolytes      Review of Systems   A comprehensive 10 point review of systems was completed.  Pertinent positives and negatives noted in the the HPI.     Current Medications[1]  Allergies:Allergies[2]   Vitals:    05/29/25 1813   BP: 113/82   Pulse: 79   Resp: 20   Temp: 98.6 °F (37 °C)   TempSrc: Oral   SpO2: 99%   Weight: 118 lb 3.2 oz (53.6 kg)   Height: 5' (1.524 m)       Body mass index is 23.08 kg/m².   PHYSICAL EXAM:   Physical Exam  Vitals reviewed.   Constitutional:       General: She is not in acute distress.     Appearance: Normal appearance. She is well-developed.   HENT:      Head: Normocephalic and atraumatic.      Right Ear: External ear normal.      Left Ear: External ear normal.   Eyes:      Conjunctiva/sclera: Conjunctivae normal.   Neck:      Thyroid: No thyroid mass or thyroid tenderness.   Cardiovascular:      Rate and Rhythm: Normal rate and regular rhythm.      Pulses: Normal pulses.      Heart sounds: Normal heart sounds, S1 normal and S2 normal. No murmur heard.  Pulmonary:      Effort: Pulmonary effort is normal. No respiratory distress.      Breath sounds: Normal breath sounds. No wheezing, rhonchi or rales.   Chest:          Comments: Localized TTP Left upper  chest  Abdominal:      General: Bowel sounds are normal.      Palpations: Abdomen is soft.   Musculoskeletal:      Cervical back: Normal range of motion and neck supple. No muscular tenderness.      Right lower leg: No edema.      Left lower leg: No edema.   Lymphadenopathy:      Cervical: No cervical adenopathy.   Skin:     General: Skin is warm and dry.      Coloration: Skin is not jaundiced.   Neurological:      General: No focal deficit present.      Mental Status: She is alert and oriented to person, place, and time. Mental status is at baseline.   Psychiatric:         Attention and Perception: Attention normal.         Mood and Affect: Mood normal.         Behavior: Behavior normal. Behavior is cooperative.         Cognition and Memory: Cognition normal.             ASSESSMENT/PLAN:   1. Polyarthralgia  Discussed DDx  Prior notes, labs, imaging reviewed  Will check labs  Continue treatment plan -- will trial muscle relaxer  - Rheumatology Referral - In Network  - Sed Rate, Trinoergren (Automated) [E]; Future  - C-Reactive Protein [E]; Future  - Connective Tissue Disease (SUE) Screen [E]; Future  - Rheumatoid Arthritis Factor [E]; Future  - TSH W Reflex To Free T4 [E]; Future    2. Anxiety  Discussed treatment options, including counseling, medication, combination  - will trial Sertraline dosing  - to call with questions/concerns  - advised to proceed to ED if feeling unsafe at home    3. Clavicle pain  - XR CLAVICLE, COMPLETE, LEFT (CPT=73000); Future    Pt verbalized understanding and agrees with plan.    Orders Placed This Encounter   Procedures    Sed Rate, Westergren (Automated) [E]    C-Reactive Protein [E]    Connective Tissue Disease (SUE) Screen [E]    Rheumatoid Arthritis Factor [E]    TSH W Reflex To Free T4 [E]       Meds This Visit:  Requested Prescriptions      No prescriptions requested or ordered in this encounter       Imaging & Referrals:  RHEUMATOLOGY - INTERNAL         ID#[id number]       [1]    Current Outpatient Medications   Medication Sig Dispense Refill    gabapentin 300 MG Oral Cap Take 1 capsule (300 mg total) by mouth nightly.      Meloxicam 7.5 MG Oral Tab Take 1 tablet (7.5 mg total) by mouth daily.      tiZANidine 4 MG Oral Tab Take 1 tablet (4 mg total) by mouth every 8 (eight) hours as needed. 30 tablet 3    sertraline 50 MG Oral Tab Take 1 tablet (50 mg total) by mouth daily. Take 1/2 tablet for 10-14 days, then increase to full tablet daily. 90 tablet 0    Prenatal Vit-Fe Fumarate-FA (SM PRENATAL VITAMINS) 28-0.8 MG Oral Tab Take by mouth As Directed.      MICROGESTIN 1/20 1-20 MG-MCG Oral Tab Take 1 tablet by mouth daily.      traZODone 50 MG Oral Tab Take 1 tablet (50 mg total) by mouth nightly as needed for Sleep. 90 tablet 0    benzonatate 200 MG Oral Cap Take 1 capsule (200 mg total) by mouth 3 (three) times daily as needed. 90 capsule 0    albuterol 108 (90 Base) MCG/ACT Inhalation Aero Soln Inhale 2 puffs into the lungs every 4 (four) hours as needed for Shortness of Breath. 18 g 0   [2] No Known Allergies

## 2025-05-30 ENCOUNTER — LAB ENCOUNTER (OUTPATIENT)
Dept: LAB | Age: 35
End: 2025-05-30
Attending: STUDENT IN AN ORGANIZED HEALTH CARE EDUCATION/TRAINING PROGRAM
Payer: COMMERCIAL

## 2025-05-30 ENCOUNTER — TELEPHONE (OUTPATIENT)
Dept: FAMILY MEDICINE CLINIC | Facility: CLINIC | Age: 35
End: 2025-05-30

## 2025-05-30 DIAGNOSIS — F41.9 ANXIETY: ICD-10-CM

## 2025-05-30 DIAGNOSIS — M25.50 POLYARTHRALGIA: ICD-10-CM

## 2025-05-30 LAB
CRP SERPL-MCNC: <0.4 MG/DL (ref ?–1)
ERYTHROCYTE [SEDIMENTATION RATE] IN BLOOD: 4 MM/HR (ref 0–20)
RHEUMATOID FACT SERPL-ACNC: 6 IU/ML (ref ?–14)
TSI SER-ACNC: 0.95 UIU/ML (ref 0.55–4.78)

## 2025-05-30 PROCEDURE — 84443 ASSAY THYROID STIM HORMONE: CPT

## 2025-05-30 PROCEDURE — 85652 RBC SED RATE AUTOMATED: CPT

## 2025-05-30 PROCEDURE — 86225 DNA ANTIBODY NATIVE: CPT

## 2025-05-30 PROCEDURE — 86431 RHEUMATOID FACTOR QUANT: CPT

## 2025-05-30 PROCEDURE — 36415 COLL VENOUS BLD VENIPUNCTURE: CPT

## 2025-05-30 PROCEDURE — 86140 C-REACTIVE PROTEIN: CPT

## 2025-05-30 PROCEDURE — 86038 ANTINUCLEAR ANTIBODIES: CPT

## 2025-05-30 NOTE — TELEPHONE ENCOUNTER
Patient called (identified name and ),   Had sertraline prescribed yesterday.  Local pharmacy told her that 90 day script needs to be sent to [facility] mail order.  Script sent per patient request.

## 2025-06-03 LAB
DSDNA IGG SERPL IA-ACNC: <0.6 IU/ML (ref ?–10)
ENA AB SER QL IA: 0.2 UG/L (ref ?–0.7)
ENA AB SER QL IA: NEGATIVE

## 2025-06-11 DIAGNOSIS — M25.50 POLYARTHRALGIA: ICD-10-CM

## 2025-08-11 DIAGNOSIS — F41.9 ANXIETY: ICD-10-CM

## 2025-08-13 DIAGNOSIS — F41.9 ANXIETY: ICD-10-CM

## 2025-08-18 DIAGNOSIS — F41.9 ANXIETY: ICD-10-CM

## (undated) NOTE — LETTER
Date: 2023      Patient Name: Noemi Phillips      : 1990        Thank you for choosing [facility] [id number] as your health care provider. Your physician has deemed the following medical service(s) necessary. However, your insurance plan may not pay for all of your health care and costs and may deny payment for this service. The fact that your insurance plan does not pay for an item or service does not mean you should not receive it. The purpose of this form is to help you make an informed decision about whether or not you want to receive this service(s) that may not be paid for by your insurance plan. CPT Code Description     Cost     Right greater trochanteric bursa injection, ultrasound guidance, local anesthesia       I understand that the above mentioned service(s) or supply may not be covered by my insurance company.  I agree to be financially responsible for the cost of this service or supply in the event of my insurance denies payment as a non-covered benefit.        ______________________________________________________________________  Signature of Patient or Patient's Representative  Relationship  Date    ______________________________________________________________________  Signature of Witness to signing of form   Printed Name

## (undated) NOTE — LETTER
11/13/2023          To Whom It May Concern:    Caroline Buckley is currently under my medical care and may not return to work at this time. Please excuse Kaya for 5 days. She may return to work on 11/19/23. Activity is restricted as follows: none. If you require additional information please contact our office.         Sincerely,      BETHANIE Stahl          Document generated by:  BETHANIE Stahl

## (undated) NOTE — LETTER
AUTHORIZATION FOR SURGICAL OPERATION OR OTHER PROCEDURE    1. I hereby authorize Dr. Zuly Man and the [facility] staff assigned to my case to perform the following operation and/or procedure at the [facility]:    _______________________________________________________________________________________________    Right greater trochanteric bursa injection, ultrasound guidance, local anesthesia   _______________________________________________________________________________________________    2. My physician has explained the nature and purpose of the operation or other procedure, possible alternative methods of treatment, the risks involved, and the possibility of complication to me. I acknowledge that no guarantee has been made as to the result that may be obtained. 3.  I recognize that, during the course of this operation, or other procedure, unforseen conditions may necessitate additional or different procedure than those listed above. I, therefore, further authorize and request that the above named physician, his/her physician assistants or designees perform such procedures as are, in his/her professional opinion, necessary and desirable. 4.  Any tissue or organs removed in the operation or other procedure may be disposed of by and at the discretion of the [facility] staff and [facility]. 5.  I understand that in the event of a medical emergency, I will be transported by local paramedics to [facility] or other hospital emergency department. 6.  I certify that I have read and fully understand the above consent to operation and/or other procedure. 7.  I acknowledge that my physician has explained sedation/analgesia administration to me including the risks and benefits. I consent to the administration of sedation/analgesia as may be necessary or desirable in the judgement of my physician.     Witness signature: ___________________________________________________ Date: ______/______/_____                    Time:  ________ A. M.  P.M. Patient Name:    Jacky Cavanaugh  9/28/1990  [id number]         Patient signature:  ___________________________________________________                 Statement of Physician  My signature below affirms that prior to the time of the procedure, I have explained to the patient and/or his/her guardian, the risks and benefits involved in the proposed treatment and any reasonable alternative to the proposed treatment. I have also explained the risks and benefits involved in the refusal of the proposed treatment and have answered the patient's questions.                         Date:  ______/______/_______  Provider                      Signature:  __________________________________________________________       Time:  ___________ A.M    P.M.